# Patient Record
Sex: MALE | Race: WHITE | NOT HISPANIC OR LATINO | Employment: FULL TIME | URBAN - METROPOLITAN AREA
[De-identification: names, ages, dates, MRNs, and addresses within clinical notes are randomized per-mention and may not be internally consistent; named-entity substitution may affect disease eponyms.]

---

## 2024-09-06 ENCOUNTER — OFFICE VISIT (OUTPATIENT)
Dept: URGENT CARE | Facility: PHYSICIAN GROUP | Age: 34
End: 2024-09-06
Payer: COMMERCIAL

## 2024-09-06 VITALS
RESPIRATION RATE: 18 BRPM | TEMPERATURE: 98.5 F | HEIGHT: 71 IN | OXYGEN SATURATION: 99 % | HEART RATE: 92 BPM | WEIGHT: 236.44 LBS | BODY MASS INDEX: 33.1 KG/M2 | SYSTOLIC BLOOD PRESSURE: 118 MMHG | DIASTOLIC BLOOD PRESSURE: 72 MMHG

## 2024-09-06 DIAGNOSIS — J02.9 SORE THROAT: ICD-10-CM

## 2024-09-06 LAB — S PYO DNA SPEC NAA+PROBE: NOT DETECTED

## 2024-09-06 PROCEDURE — 3078F DIAST BP <80 MM HG: CPT | Performed by: NURSE PRACTITIONER

## 2024-09-06 PROCEDURE — 3074F SYST BP LT 130 MM HG: CPT | Performed by: NURSE PRACTITIONER

## 2024-09-06 PROCEDURE — 99203 OFFICE O/P NEW LOW 30 MIN: CPT | Performed by: NURSE PRACTITIONER

## 2024-09-06 PROCEDURE — 87651 STREP A DNA AMP PROBE: CPT | Performed by: NURSE PRACTITIONER

## 2024-09-06 NOTE — LETTER
September 6, 2024       Patient: Rosa Perez   YOB: 1990   Date of Visit: 9/6/2024         To Whom It May Concern:    In my medical opinion, I recommend that Rosa Perez be excused from work from 9/4/2024, 9/5/2024, and 9/6/2024 due to illness.      If you have any questions or concerns, please don't hesitate to call [phone]          Sincerely,          JAI Anders.P.R.N.  Electronically Signed

## 2024-10-09 ENCOUNTER — APPOINTMENT (OUTPATIENT)
Dept: URGENT CARE | Facility: PHYSICIAN GROUP | Age: 34
End: 2024-10-09
Payer: COMMERCIAL

## 2024-10-09 ENCOUNTER — OFFICE VISIT (OUTPATIENT)
Dept: URGENT CARE | Facility: PHYSICIAN GROUP | Age: 34
End: 2024-10-09
Payer: COMMERCIAL

## 2024-10-09 ENCOUNTER — OCCUPATIONAL MEDICINE (OUTPATIENT)
Dept: URGENT CARE | Facility: PHYSICIAN GROUP | Age: 34
End: 2024-10-09
Payer: COMMERCIAL

## 2024-10-09 ENCOUNTER — HOSPITAL ENCOUNTER (OUTPATIENT)
Facility: MEDICAL CENTER | Age: 34
End: 2024-10-09
Attending: PHYSICIAN ASSISTANT
Payer: COMMERCIAL

## 2024-10-09 VITALS
OXYGEN SATURATION: 95 % | TEMPERATURE: 98.1 F | DIASTOLIC BLOOD PRESSURE: 68 MMHG | HEIGHT: 71 IN | RESPIRATION RATE: 25 BRPM | SYSTOLIC BLOOD PRESSURE: 120 MMHG | BODY MASS INDEX: 32.25 KG/M2 | HEART RATE: 120 BPM | WEIGHT: 230.38 LBS

## 2024-10-09 VITALS
DIASTOLIC BLOOD PRESSURE: 68 MMHG | WEIGHT: 230 LBS | SYSTOLIC BLOOD PRESSURE: 120 MMHG | BODY MASS INDEX: 32.2 KG/M2 | TEMPERATURE: 98.1 F | OXYGEN SATURATION: 95 % | HEART RATE: 120 BPM | HEIGHT: 71 IN | RESPIRATION RATE: 25 BRPM

## 2024-10-09 DIAGNOSIS — J02.9 SORE THROAT: ICD-10-CM

## 2024-10-09 DIAGNOSIS — S39.012A STRAIN OF LUMBAR REGION, INITIAL ENCOUNTER: ICD-10-CM

## 2024-10-09 LAB
FLUAV RNA SPEC QL NAA+PROBE: NEGATIVE
FLUBV RNA SPEC QL NAA+PROBE: NEGATIVE
RSV RNA SPEC QL NAA+PROBE: NEGATIVE
S PYO DNA SPEC NAA+PROBE: NOT DETECTED
SARS-COV-2 RNA RESP QL NAA+PROBE: NEGATIVE

## 2024-10-09 PROCEDURE — 87070 CULTURE OTHR SPECIMN AEROBIC: CPT

## 2024-10-09 PROCEDURE — 3078F DIAST BP <80 MM HG: CPT | Performed by: PHYSICIAN ASSISTANT

## 2024-10-09 PROCEDURE — 87077 CULTURE AEROBIC IDENTIFY: CPT | Mod: 91

## 2024-10-09 PROCEDURE — 99213 OFFICE O/P EST LOW 20 MIN: CPT | Performed by: PHYSICIAN ASSISTANT

## 2024-10-09 PROCEDURE — 0241U POCT CEPHEID COV-2, FLU A/B, RSV - PCR: CPT | Performed by: PHYSICIAN ASSISTANT

## 2024-10-09 PROCEDURE — 87651 STREP A DNA AMP PROBE: CPT | Performed by: PHYSICIAN ASSISTANT

## 2024-10-09 PROCEDURE — 3074F SYST BP LT 130 MM HG: CPT | Performed by: PHYSICIAN ASSISTANT

## 2024-10-09 RX ORDER — DEXAMETHASONE SODIUM PHOSPHATE 10 MG/ML
10 INJECTION INTRAMUSCULAR; INTRAVENOUS ONCE
Status: COMPLETED | OUTPATIENT
Start: 2024-10-09 | End: 2024-10-09

## 2024-10-09 RX ORDER — CYCLOBENZAPRINE HCL 5 MG
5-10 TABLET ORAL NIGHTLY PRN
Qty: 20 TABLET | Refills: 0 | Status: SHIPPED | OUTPATIENT
Start: 2024-10-09

## 2024-10-09 RX ADMIN — DEXAMETHASONE SODIUM PHOSPHATE 10 MG: 10 INJECTION INTRAMUSCULAR; INTRAVENOUS at 14:24

## 2024-10-09 ASSESSMENT — ENCOUNTER SYMPTOMS
FEVER: 1
HEADACHES: 0
SORE THROAT: 1
TROUBLE SWALLOWING: 1
MYALGIAS: 0
DIARRHEA: 0
VOMITING: 0
BACK PAIN: 1
EYE DISCHARGE: 0
BACK PAIN: 1
SHORTNESS OF BREATH: 0
SWOLLEN GLANDS: 1
EYE REDNESS: 0
NAUSEA: 0
COUGH: 1

## 2024-10-11 LAB
BACTERIA SPEC RESP CULT: ABNORMAL
BACTERIA SPEC RESP CULT: ABNORMAL
SIGNIFICANT IND 70042: ABNORMAL
SITE SITE: ABNORMAL
SOURCE SOURCE: ABNORMAL

## 2024-10-13 ENCOUNTER — TELEPHONE (OUTPATIENT)
Dept: URGENT CARE | Facility: PHYSICIAN GROUP | Age: 34
End: 2024-10-13
Payer: COMMERCIAL

## 2024-10-13 DIAGNOSIS — J02.8 BACTERIAL PHARYNGITIS: ICD-10-CM

## 2024-10-13 DIAGNOSIS — B96.89 BACTERIAL PHARYNGITIS: ICD-10-CM

## 2024-10-14 ENCOUNTER — TELEPHONE (OUTPATIENT)
Dept: URGENT CARE | Facility: PHYSICIAN GROUP | Age: 34
End: 2024-10-14
Payer: COMMERCIAL

## 2024-10-16 ENCOUNTER — OCCUPATIONAL MEDICINE (OUTPATIENT)
Dept: URGENT CARE | Facility: PHYSICIAN GROUP | Age: 34
End: 2024-10-16
Payer: COMMERCIAL

## 2024-10-16 VITALS
OXYGEN SATURATION: 97 % | WEIGHT: 231 LBS | SYSTOLIC BLOOD PRESSURE: 128 MMHG | TEMPERATURE: 98.1 F | RESPIRATION RATE: 22 BRPM | BODY MASS INDEX: 32.34 KG/M2 | DIASTOLIC BLOOD PRESSURE: 70 MMHG | HEIGHT: 71 IN | HEART RATE: 107 BPM

## 2024-10-16 DIAGNOSIS — S39.012D LUMBAR STRAIN, SUBSEQUENT ENCOUNTER: ICD-10-CM

## 2024-10-16 PROCEDURE — 3078F DIAST BP <80 MM HG: CPT | Performed by: PHYSICIAN ASSISTANT

## 2024-10-16 PROCEDURE — 3074F SYST BP LT 130 MM HG: CPT | Performed by: PHYSICIAN ASSISTANT

## 2024-10-16 PROCEDURE — 99213 OFFICE O/P EST LOW 20 MIN: CPT | Performed by: PHYSICIAN ASSISTANT

## 2024-10-16 ASSESSMENT — ENCOUNTER SYMPTOMS
TINGLING: 0
BACK PAIN: 0
WEAKNESS: 0
MYALGIAS: 0

## 2025-02-11 ENCOUNTER — OFFICE VISIT (OUTPATIENT)
Dept: URGENT CARE | Facility: PHYSICIAN GROUP | Age: 35
End: 2025-02-11
Payer: COMMERCIAL

## 2025-02-11 VITALS
HEART RATE: 90 BPM | RESPIRATION RATE: 16 BRPM | SYSTOLIC BLOOD PRESSURE: 108 MMHG | WEIGHT: 232 LBS | HEIGHT: 71 IN | OXYGEN SATURATION: 95 % | DIASTOLIC BLOOD PRESSURE: 64 MMHG | BODY MASS INDEX: 32.48 KG/M2

## 2025-02-11 DIAGNOSIS — J06.9 VIRAL URI WITH COUGH: ICD-10-CM

## 2025-02-11 PROCEDURE — 99213 OFFICE O/P EST LOW 20 MIN: CPT | Performed by: STUDENT IN AN ORGANIZED HEALTH CARE EDUCATION/TRAINING PROGRAM

## 2025-02-11 PROCEDURE — 3078F DIAST BP <80 MM HG: CPT | Performed by: STUDENT IN AN ORGANIZED HEALTH CARE EDUCATION/TRAINING PROGRAM

## 2025-02-11 PROCEDURE — 3074F SYST BP LT 130 MM HG: CPT | Performed by: STUDENT IN AN ORGANIZED HEALTH CARE EDUCATION/TRAINING PROGRAM

## 2025-02-11 NOTE — LETTER
[facility] [address] [address]     February 11, 2025    Patient: Rosa Perez   YOB: 1990   Date of Visit: 2/11/2025       To Whom It May Concern:    Rosa Perez was seen and treated in our department on 2/11/2025.  He is excused from work Sunday through Wednesday, cleared to return to work on Sunday, 2/16/2025    Sincerely,     Kye Dobbs D.O.

## 2025-02-12 NOTE — PROGRESS NOTES
Subjective:   CHIEF COMPLAINT  Chief Complaint   Patient presents with    Flu Like Symptoms     X 3 days with vomiting, cough, sore throat and needs a doctors note for work       HPI    History of Present Illness  Rosa Perez is a 34 y.o. male who presents for evaluation of cough, sore throat, and fever (low 100s)    2 days ago patient developed above symptoms additionally nausea and vomiting GI symptoms have resolved.  Overall patient states he is feeling better but still having some bodyaches and a persistent cough.  States he has been managing his symptoms with Aleve which have provided some relief.  No wheezing or shortness of breath.  No associated symptoms of sore throat or abdominal pain.  Does not use tobacco but smokes MJ.  No history of asthma or use of inhalers.  Requesting a note for work.        SOCIAL HISTORY  The patient smokes marijuana.    MEDICATIONS  ibuprofen        REVIEW OF SYSTEMS  General: no fever or chills  GI: no nausea or vomiting  See HPI for further details.    PAST MEDICAL HISTORY  There are no active problems to display for this patient.      SURGICAL HISTORY  patient denies any surgical history    ALLERGIES  No Known Allergies    CURRENT MEDICATIONS  Home Medications       Reviewed by Orestes Jackson Ass't (Medical Assistant) on 02/11/25 at 1555  Med List Status: <None>     Medication Last Dose Status   cyclobenzaprine (FLEXERIL) 5 mg tablet Not Taking Active                    SOCIAL HISTORY  Social History     Tobacco Use    Smoking status: Former     Types: Cigarettes    Smokeless tobacco: Never   Vaping Use    Vaping status: Never Used   Substance and Sexual Activity    Alcohol use: Yes     Comment: twice a week    Drug use: Not Currently     Types: Marijuana    Sexual activity: Not on file       FAMILY HISTORY  No family history on file.       Objective:   PHYSICAL EXAM  VITAL SIGNS: /64 (BP Location: Left arm, Patient Position: Sitting, BP Cuff Size: Adult  "long)   Pulse 90   Resp 16   Ht 1.803 m (5' 11\")   Wt 105 kg (232 lb)   SpO2 95%   BMI 32.36 kg/m²     Gen: no acute distress, normal voice  Skin: dry, intact, moist mucosal membranes  Eyes: No conjunctival injection b/l  Neck: Normal range of motion. No meningeal signs.   ENT: No oropharyngeal erythema or exudates. Uvula midline.  No lymphadenopathy.  Lungs: No increased work of breathing.  CTAB w/ symmetric expansion  CV: RRR w/o murmurs or clicks  Psych: normal affect, normal judgement, alert, awake    Assessment/Plan:     1. Viral URI with cough        Signs and symptoms are consistent with a viral respiratory infection and should be self-limiting.  Recommended symptomatic treatment including Motrin, Tylenol, relative rest and fluid hydration. Return to urgent care any new/worsening symptoms or further questions or concerns.  Patient understood everything discussed.  All questions were answered.    Provided a note for work        Please note that this dictation was created using voice recognition software. I have made a reasonable attempt to correct obvious errors, but I expect that there are errors of grammar and possibly content that I did not discover before finalizing the note.         "

## 2025-04-01 ENCOUNTER — RESULTS FOLLOW-UP (OUTPATIENT)
Dept: URGENT CARE | Facility: PHYSICIAN GROUP | Age: 35
End: 2025-04-01

## 2025-04-01 ENCOUNTER — OFFICE VISIT (OUTPATIENT)
Dept: URGENT CARE | Facility: PHYSICIAN GROUP | Age: 35
End: 2025-04-01
Payer: COMMERCIAL

## 2025-04-01 VITALS
BODY MASS INDEX: 33.19 KG/M2 | RESPIRATION RATE: 19 BRPM | HEART RATE: 90 BPM | SYSTOLIC BLOOD PRESSURE: 122 MMHG | TEMPERATURE: 97.7 F | HEIGHT: 71 IN | DIASTOLIC BLOOD PRESSURE: 80 MMHG | OXYGEN SATURATION: 98 % | WEIGHT: 237.1 LBS

## 2025-04-01 DIAGNOSIS — J02.9 PHARYNGITIS, UNSPECIFIED ETIOLOGY: ICD-10-CM

## 2025-04-01 DIAGNOSIS — R05.1 ACUTE COUGH: ICD-10-CM

## 2025-04-01 LAB — S PYO DNA SPEC NAA+PROBE: NOT DETECTED

## 2025-04-01 PROCEDURE — 3074F SYST BP LT 130 MM HG: CPT | Performed by: FAMILY MEDICINE

## 2025-04-01 PROCEDURE — 3079F DIAST BP 80-89 MM HG: CPT | Performed by: FAMILY MEDICINE

## 2025-04-01 PROCEDURE — 99213 OFFICE O/P EST LOW 20 MIN: CPT | Performed by: FAMILY MEDICINE

## 2025-04-01 PROCEDURE — 87651 STREP A DNA AMP PROBE: CPT | Performed by: FAMILY MEDICINE

## 2025-04-01 RX ORDER — DEXTROMETHORPHAN HYDROBROMIDE AND PROMETHAZINE HYDROCHLORIDE 15; 6.25 MG/5ML; MG/5ML
5 SYRUP ORAL 4 TIMES DAILY PRN
Qty: 120 ML | Refills: 0 | Status: SHIPPED | OUTPATIENT
Start: 2025-04-01

## 2025-04-01 RX ORDER — LIDOCAINE HYDROCHLORIDE 20 MG/ML
15 SOLUTION OROPHARYNGEAL EVERY 4 HOURS PRN
Qty: 120 ML | Refills: 0 | Status: SHIPPED | OUTPATIENT
Start: 2025-04-01

## 2025-04-01 ASSESSMENT — ENCOUNTER SYMPTOMS
WEIGHT LOSS: 0
NAUSEA: 0
MYALGIAS: 0
EYE REDNESS: 0
EYE DISCHARGE: 0
VOMITING: 0

## 2025-04-01 NOTE — PROGRESS NOTES
"Subjective     Rosa Perez is a 34 y.o. male who presents with Cough (C/o chest congestion and sore throat x 3 weeks. )            3 weeks productive cough without blood in sputum.  Sore throat is worsened over the last day or 2.  Tolerating fluids with normal urine output.  No fever.  No shortness of breath or wheezing.  No other aggravating alleviating factors.        Review of Systems   Constitutional:  Negative for malaise/fatigue and weight loss.   Eyes:  Negative for discharge and redness.   Gastrointestinal:  Negative for nausea and vomiting.   Musculoskeletal:  Negative for joint pain and myalgias.   Skin:  Negative for itching and rash.              Objective     /80 (BP Location: Left arm, Patient Position: Sitting, BP Cuff Size: Adult long)   Pulse 90   Temp 36.5 °C (97.7 °F) (Temporal)   Resp 19   Ht 1.803 m (5' 11\")   Wt 108 kg (237 lb 1.7 oz)   SpO2 98%   BMI 33.07 kg/m²      Physical Exam  Constitutional:       General: He is not in acute distress.     Appearance: He is well-developed.   HENT:      Head: Normocephalic and atraumatic.      Right Ear: Tympanic membrane normal.      Left Ear: Tympanic membrane normal.      Nose: Nose normal. No congestion.      Mouth/Throat:      Mouth: Mucous membranes are moist.      Pharynx: Posterior oropharyngeal erythema present.   Eyes:      Conjunctiva/sclera: Conjunctivae normal.   Cardiovascular:      Rate and Rhythm: Normal rate and regular rhythm.      Heart sounds: Normal heart sounds. No murmur heard.  Pulmonary:      Effort: Pulmonary effort is normal.      Breath sounds: Normal breath sounds. No wheezing.   Musculoskeletal:      Cervical back: Neck supple.   Lymphadenopathy:      Cervical: No cervical adenopathy.   Skin:     General: Skin is warm and dry.      Findings: No rash.   Neurological:      Mental Status: He is alert.                Poct pcr strep negative    1. Pharyngitis, unspecified etiology  POCT CEPHEID GROUP A STREP - " PCR    lidocaine (XYLOCAINE) 2 % Solution      2. Acute cough  promethazine-dextromethorphan (PROMETHAZINE-DM) 6.25-15 MG/5ML syrup        Differential diagnosis, natural history, supportive care, and indications for immediate follow-up were discussed.

## 2025-04-01 NOTE — LETTER
April 1, 2025         Patient: Rosa Perez   YOB: 1990   Date of Visit: 4/1/2025           To Whom it May Concern:    Rosa Perez was seen in my clinic on 4/1/2025. Please excuse work absences 3/30 through 4/1/2025.       Sincerely,           Norberto Mendez M.D.  Electronically Signed

## 2025-05-13 ENCOUNTER — OFFICE VISIT (OUTPATIENT)
Dept: URGENT CARE | Facility: PHYSICIAN GROUP | Age: 35
End: 2025-05-13
Payer: COMMERCIAL

## 2025-05-13 VITALS
DIASTOLIC BLOOD PRESSURE: 64 MMHG | SYSTOLIC BLOOD PRESSURE: 122 MMHG | WEIGHT: 227.6 LBS | HEART RATE: 99 BPM | OXYGEN SATURATION: 98 % | BODY MASS INDEX: 31.86 KG/M2 | RESPIRATION RATE: 18 BRPM | HEIGHT: 71 IN | TEMPERATURE: 98 F

## 2025-05-13 DIAGNOSIS — J06.9 VIRAL URI: ICD-10-CM

## 2025-05-13 PROCEDURE — 3074F SYST BP LT 130 MM HG: CPT | Performed by: NURSE PRACTITIONER

## 2025-05-13 PROCEDURE — 99213 OFFICE O/P EST LOW 20 MIN: CPT | Performed by: NURSE PRACTITIONER

## 2025-05-13 PROCEDURE — 3078F DIAST BP <80 MM HG: CPT | Performed by: NURSE PRACTITIONER

## 2025-05-13 NOTE — LETTER
May 13, 2025       Patient: Rosa Perez   YOB: 1990   Date of Visit: 5/13/2025         To Whom It May Concern:    In my medical opinion, I recommend that Rosa Perez be excused from work from 5/7/2025 through 5/13/2025 due to illness.     If you have any questions or concerns, please don't hesitate to call [phone]          Sincerely,          JAI Anders.TRAVIS.R.N.  Electronically Signed

## 2025-05-14 NOTE — PROGRESS NOTES
Verbal consent was acquired by the patient to use [employer] in ambient listening note generation during this visit          Chief Complaint   Patient presents with    Pharyngitis     X 1 week patient has sore throat, pain in chest, and stuffy nose.          History of Present Illness  The patient is a 34-year-old male who presents for evaluation of sore throat, sinus congestion, and chest congestion.    He has been experiencing symptoms of pharyngitis, sinusitis, and bronchitis, which have resulted in a week-long absence from work. He suspects that he may have contracted an infection from his stepdaughter, who was recently diagnosed with an ear infection. His symptoms began on 05/07/2025, and he reports a gradual improvement, with today being the most symptom-free day. He has been unable to work since 05/07/2025 and is seeking a note to return to work.    He reports that his chest congestion has improved, and his cough has lessened. He also reports mild sinus pressure and pain. He has been managing his symptoms with NyQuil earlier in the week and ibuprofen to reduce lymph node swelling.         ROS:    No severe shortness of breath   No cardiac like chest pain, as discussed   As otherwise stated in HPI    Medical/SX/ Social History:  Reviewed per chart    Pertinent Medications:    Current Outpatient Medications on File Prior to Visit   Medication Sig Dispense Refill    lidocaine (XYLOCAINE) 2 % Solution Take 15 mL by mouth every four hours as needed for Throat/Mouth Pain. Gargle and spit every 4 hours as needed (Patient not taking: Reported on 5/13/2025) 120 mL 0    promethazine-dextromethorphan (PROMETHAZINE-DM) 6.25-15 MG/5ML syrup Take 5 mL by mouth 4 times a day as needed for Cough. (Patient not taking: Reported on 5/13/2025) 120 mL 0    cyclobenzaprine (FLEXERIL) 5 mg tablet Take 1-2 Tablets by mouth at bedtime as needed for Muscle Spasms. (Patient not taking: Reported on 2/11/2025) 20 Tablet 0     No current  facility-administered medications on file prior to visit.        Allergies:    Patient has no known allergies.     Problem list, medications, and allergies reviewed by myself today in Epic     Physical Exam:    Vitals:    05/13/25 1738   BP: 122/64   Pulse: 99   Resp: 18   Temp: 36.7 °C (98 °F)   SpO2: 98%             Physical Exam  Vitals and nursing note reviewed.   Constitutional:       General: He is not in acute distress.     Appearance: Normal appearance. He is not ill-appearing or toxic-appearing.   HENT:      Head: Normocephalic and atraumatic.      Right Ear: Tympanic membrane, ear canal and external ear normal.      Left Ear: Tympanic membrane, ear canal and external ear normal.      Nose: Congestion and rhinorrhea present.      Mouth/Throat:      Mouth: Mucous membranes are moist.      Pharynx: Oropharynx is clear. Posterior oropharyngeal erythema present.   Eyes:      Extraocular Movements: Extraocular movements intact.      Conjunctiva/sclera: Conjunctivae normal.      Pupils: Pupils are equal, round, and reactive to light.   Cardiovascular:      Rate and Rhythm: Normal rate and regular rhythm.      Pulses: Normal pulses.      Heart sounds: Normal heart sounds.   Pulmonary:      Effort: Pulmonary effort is normal.      Breath sounds: Normal breath sounds. No wheezing, rhonchi or rales.   Musculoskeletal:         General: Normal range of motion.      Cervical back: Normal range of motion and neck supple. No tenderness.   Lymphadenopathy:      Cervical: No cervical adenopathy.   Skin:     General: Skin is warm.      Capillary Refill: Capillary refill takes less than 2 seconds.   Neurological:      General: No focal deficit present.      Mental Status: He is alert and oriented to person, place, and time.        Medical Decision making and plan :  I personally reviewed prior external notes and test results pertinent to today's visit. Pt is clinically stable at today's acute urgent care visit.  Patient  appears nontoxic with no acute distress noted. Appropriate for outpatient care at this time.    Pleasant 34 y.o. male presented clinic with:     Assessment & Plan  1. Upper respiratory infection, resolving.  - Reports sore throat, sinus congestion, and chest congestion starting on 05/07/2025. Symptoms have been improving, with today being the best he has felt.  - Examination reveals clear lungs, no significant ear or throat infection noted.  - Discussed the option of antibiotics versus natural recovery. Patient prefers to avoid antibiotics and let his body continue to recover naturally.  - Provided a work note to excuse his absence from 05/07/2025 through 05/13/2025.  - Return for worsening or persistent symptoms.       Shared decision-making was utilized with patient for treatment plan. Medication discussed included indication for use and the potential benefits and side effects. Education was provided regarding the aforementioned assessments.  Differential Diagnosis, natural history, and supportive care discussed. All of the patient's questions were answered to their satisfaction at the time of discharge. Patient was encouraged to monitor symptoms closely. Those signs and symptoms which would warrant concern and mandate seeking a higher level of service through the emergency department discussed at length.  Patient stated agreement and understanding of this plan of care.    Disposition:  Home in stable condition     Voice Recognition Disclaimer:  Portions of this document were created using voice recognition software and [employer] technology provided by [employer]. The software does have a chance of producing errors of grammar and possibly content. I have made every reasonable attempt to correct obvious errors, but there may be errors of grammar and possibly content that I did not discover before finalizing the  documentation.    DARIANA Anders.